# Patient Record
Sex: FEMALE | Race: WHITE | NOT HISPANIC OR LATINO | Employment: FULL TIME | URBAN - METROPOLITAN AREA
[De-identification: names, ages, dates, MRNs, and addresses within clinical notes are randomized per-mention and may not be internally consistent; named-entity substitution may affect disease eponyms.]

---

## 2023-08-26 LAB
CHLAMYDIA TRACH., AMPLIFIED: NEGATIVE
N. GONORRHEA, AMPLIFIED: NEGATIVE
TRICHOMONAS VAGINALIS: NEGATIVE

## 2024-04-02 ENCOUNTER — PROCEDURE VISIT (OUTPATIENT)
Dept: OBSTETRICS AND GYNECOLOGY | Facility: HOSPITAL | Age: 24
End: 2024-04-02
Payer: COMMERCIAL

## 2024-04-02 VITALS
DIASTOLIC BLOOD PRESSURE: 87 MMHG | WEIGHT: 165 LBS | BODY MASS INDEX: 26.52 KG/M2 | SYSTOLIC BLOOD PRESSURE: 121 MMHG | HEIGHT: 66 IN | HEART RATE: 93 BPM

## 2024-04-02 DIAGNOSIS — Z30.432 ENCOUNTER FOR IUD REMOVAL: Primary | ICD-10-CM

## 2024-04-02 ASSESSMENT — PAIN SCALES - GENERAL: PAINLEVEL: 0-NO PAIN

## 2024-04-02 NOTE — PROGRESS NOTES
"Assessment/Plan   Problem List Items Addressed This Visit    None  Visit Diagnoses         Codes    Encounter for IUD removal    -  Primary Z30.432            The patient was advised to call for any fever or for prolonged or severe pain or bleeding. She was advised to use OTC acetaminophen and OTC ibuprofen as needed for mild to moderate pain.     ATTILA Garrison-PEDRO    Eli Ramsey is a 23 y.o. female who presents for IUD removal.    HPI    Patient with Mirena IUD placed 2 years ago. She desires removal d/t irregular spotting; will use condoms for birth control.    Menstrual History:  OB History   No obstetric history on file.     Patient's last menstrual period was 03/30/2024 (exact date).     Objective   /87 (BP Location: Left arm, Patient Position: Sitting, BP Cuff Size: Adult)   Pulse 93   Ht 1.676 m (5' 6\")   Wt 74.8 kg (165 lb)   LMP 03/30/2024 (Exact Date)   BMI 26.63 kg/m²     IUD Removal Procedure Note  Procedure Details  IUD strings visible:  yes  Local anesthesia:  None  Tenaculum used:  None  Removal:  IUD strings grasped and IUD removed intact with gentle traction.  The patient tolerated the procedure well.    All appropriate instructions regarding removal were reviewed.    Plans for contraception:  condoms    Other follow-up needed:  none   "

## 2024-04-17 ENCOUNTER — APPOINTMENT (OUTPATIENT)
Dept: OBSTETRICS AND GYNECOLOGY | Facility: HOSPITAL | Age: 24
End: 2024-04-17
Payer: COMMERCIAL

## 2024-04-30 ENCOUNTER — APPOINTMENT (OUTPATIENT)
Dept: OBSTETRICS AND GYNECOLOGY | Facility: HOSPITAL | Age: 24
End: 2024-04-30
Payer: COMMERCIAL

## 2024-05-30 ENCOUNTER — LAB REQUISITION (OUTPATIENT)
Dept: LAB | Facility: HOSPITAL | Age: 24
End: 2024-05-30
Payer: COMMERCIAL

## 2024-05-30 DIAGNOSIS — R30.0 DYSURIA: ICD-10-CM

## 2024-05-30 PROCEDURE — 87086 URINE CULTURE/COLONY COUNT: CPT

## 2024-06-01 LAB — BACTERIA UR CULT: NORMAL

## 2025-02-21 ENCOUNTER — APPOINTMENT (OUTPATIENT)
Dept: PRIMARY CARE | Facility: CLINIC | Age: 25
End: 2025-02-21
Payer: COMMERCIAL

## 2025-03-07 ENCOUNTER — APPOINTMENT (OUTPATIENT)
Dept: PRIMARY CARE | Facility: CLINIC | Age: 25
End: 2025-03-07
Payer: COMMERCIAL

## 2025-03-07 VITALS
WEIGHT: 151 LBS | BODY MASS INDEX: 24.27 KG/M2 | HEIGHT: 66 IN | HEART RATE: 85 BPM | SYSTOLIC BLOOD PRESSURE: 121 MMHG | DIASTOLIC BLOOD PRESSURE: 79 MMHG | OXYGEN SATURATION: 97 %

## 2025-03-07 DIAGNOSIS — Z13.220 SCREENING FOR HYPERLIPIDEMIA: ICD-10-CM

## 2025-03-07 DIAGNOSIS — Q27.9 VENOUS MALFORMATION (HHS-HCC): ICD-10-CM

## 2025-03-07 DIAGNOSIS — T78.40XA ALLERGY, INITIAL ENCOUNTER: ICD-10-CM

## 2025-03-07 DIAGNOSIS — Z13.21 ENCOUNTER FOR VITAMIN DEFICIENCY SCREENING: ICD-10-CM

## 2025-03-07 DIAGNOSIS — Z80.3 FAMILY HISTORY OF BREAST CANCER IN FEMALE: ICD-10-CM

## 2025-03-07 DIAGNOSIS — Z00.00 WELLNESS EXAMINATION: Primary | ICD-10-CM

## 2025-03-07 PROCEDURE — 3008F BODY MASS INDEX DOCD: CPT

## 2025-03-07 PROCEDURE — 99385 PREV VISIT NEW AGE 18-39: CPT

## 2025-03-07 PROCEDURE — 1036F TOBACCO NON-USER: CPT

## 2025-03-07 ASSESSMENT — PATIENT HEALTH QUESTIONNAIRE - PHQ9
1. LITTLE INTEREST OR PLEASURE IN DOING THINGS: NOT AT ALL
2. FEELING DOWN, DEPRESSED OR HOPELESS: NOT AT ALL
SUM OF ALL RESPONSES TO PHQ9 QUESTIONS 1 AND 2: 0

## 2025-03-07 NOTE — PATIENT INSTRUCTIONS
Please make sure you fast for 8 hours prior to getting labs completed.     Dr. [facility] out of [place]

## 2025-03-07 NOTE — PROGRESS NOTES
"  Subjective   Patient ID: Roxy Diallo is a 24 y.o. female who presents for Establish Care (Pt states she is here to establish care. Pt states she doesn't see OBGYN. Last pap 2024. No other concerns at the moment.).    Patient presents to establish care and have yearly physical.  Patient states that she does have a past medical history of anxiety/depression, if it cannot allergies to multiple things, and venous malformation of the right upper extremity.  Patient states that the venous malformation has been there since she has been a child and had seen vascular surgery at 1 time who recommended an MRI but however she has never obtained this.    Patient states that with her anxiety/depression she was previously on an SSRI however she has been off that for roughly about 2 years and has been doing significantly well.  Patient states that she does therapy sessions once or twice a month which is helpful.    Patient states that she has a allergy to mangoes penicillin G, however she states that it seems like her allergies are getting worse over the years and was wanting to see an allergist about what else she could possibly be allergic to.        Past Medical, Surgical, Social and Family Hx reviewed-Yes      Last pap: Unknown  Last Mammogram: N/A  Colon CA screening Hx: N/A  Labs: CMP, Vit D, Lipid  Up to date on immunizations: Declined Tdap at this time  Dentist in the past year: No     Menstruation: Periods regular, no concerns   Sexual Activity: No concerns         PE:  /79   Pulse 85   Ht 1.676 m (5' 6\")   Wt 68.5 kg (151 lb)   LMP 02/13/2025   SpO2 97%   BMI 24.37 kg/m²   Alert adult woman, NAD  HEENT clear  Neck supple, No LAD  Heart RRR no murmur  Lungs CTA bilat  Abdomen benign, normal BS, soft NT/ND  Skin warm, dry, clear, Venous Malformation of right upper extremity  Neuro grossly normal, gait WNL  Affect pleasant and appropriate, memory and judgement WNL    Discussed with patient that we will refer " her to the genetics for significant family history of breast cancer, we will refer to vascular for her venous malformation of right upper extremity and to an allergist for her worsening allergies.  Baseline lab work ordered for yearly physical.  Recommending following up in 1 year for yearly physical.    Assessment/Plan   Problem List Items Addressed This Visit             ICD-10-CM    Family history of breast cancer in female Z80.3    Relevant Orders    Referral to Genetics    Venous malformation ([facility]) Q27.9    Relevant Orders    Referral to Vascular Surgery    Wellness examination - Primary Z00.00    Relevant Orders    Comprehensive Metabolic Panel    Allergies T78.40XA    Relevant Orders    Referral to Allergy     Other Visit Diagnoses         Codes    Screening for hyperlipidemia     Z13.220    Relevant Orders    Lipid Panel    Encounter for vitamin deficiency screening     Z13.21    Relevant Orders    Vitamin D 25-Hydroxy,Total (for eval of Vitamin D levels)

## 2025-05-08 LAB
25(OH)D3+25(OH)D2 SERPL-MCNC: 24 NG/ML (ref 30–100)
ALBUMIN SERPL-MCNC: 4.9 G/DL (ref 3.6–5.1)
ALP SERPL-CCNC: 79 U/L (ref 31–125)
ALT SERPL-CCNC: 9 U/L (ref 6–29)
ANION GAP SERPL CALCULATED.4IONS-SCNC: 9 MMOL/L (CALC) (ref 7–17)
AST SERPL-CCNC: 14 U/L (ref 10–30)
BILIRUB SERPL-MCNC: 0.7 MG/DL (ref 0.2–1.2)
BUN SERPL-MCNC: 16 MG/DL (ref 7–25)
CALCIUM SERPL-MCNC: 9.4 MG/DL (ref 8.6–10.2)
CHLORIDE SERPL-SCNC: 106 MMOL/L (ref 98–110)
CHOLEST SERPL-MCNC: 141 MG/DL
CHOLEST/HDLC SERPL: 2.6 (CALC)
CO2 SERPL-SCNC: 24 MMOL/L (ref 20–32)
CREAT SERPL-MCNC: 0.69 MG/DL (ref 0.5–0.96)
EGFRCR SERPLBLD CKD-EPI 2021: 125 ML/MIN/1.73M2
GLUCOSE SERPL-MCNC: 73 MG/DL (ref 65–99)
HDLC SERPL-MCNC: 55 MG/DL
LDLC SERPL CALC-MCNC: 71 MG/DL (CALC)
NONHDLC SERPL-MCNC: 86 MG/DL (CALC)
POTASSIUM SERPL-SCNC: 4.4 MMOL/L (ref 3.5–5.3)
PROT SERPL-MCNC: 7 G/DL (ref 6.1–8.1)
SODIUM SERPL-SCNC: 139 MMOL/L (ref 135–146)
TRIGL SERPL-MCNC: 70 MG/DL

## 2025-07-13 ENCOUNTER — TELEMEDICINE (OUTPATIENT)
Dept: PRIMARY CARE | Facility: CLINIC | Age: 25
End: 2025-07-13
Payer: COMMERCIAL

## 2025-07-13 DIAGNOSIS — L24.7 IRRITANT CONTACT DERMATITIS DUE TO PLANTS, EXCEPT FOOD: Primary | ICD-10-CM

## 2025-07-13 PROCEDURE — 99213 OFFICE O/P EST LOW 20 MIN: CPT

## 2025-07-13 RX ORDER — CLOBETASOL PROPIONATE 0.5 MG/G
CREAM TOPICAL 2 TIMES DAILY
Qty: 60 G | Refills: 0 | Status: SHIPPED | OUTPATIENT
Start: 2025-07-13 | End: 2025-07-20

## 2025-07-13 NOTE — PROGRESS NOTES
Subjective   Patient ID: Roxy Diallo is a 24 y.o. female who presents for No chief complaint on file..  HPI    Patient presents for complaints of possible poison ivy to left cheek, right forearm, and abdomen.  Patient states that she has been working out in her yard as she is getting ready to sell her house and noticed it about a week and a half ago.  Patient states that she has tried everything over-the-counter with minimal relief of symptoms.  Patient states that she has had poison ivy in the past and seems similar to previous episodes.      Objective   Physical Exam  Constitutional:       Appearance: Normal appearance.   Pulmonary:      Effort: Pulmonary effort is normal.   Skin:     Findings: Rash (left cheek, right forearm) present.   Neurological:      Mental Status: She is alert and oriented to person, place, and time.       Discussed with patient if rash does not improve to send me a message in [facility] and we will send over prescription for prednisone.  Informed her if the rash gets worse she needs to follow-up with me in the office for further evaluation.    Assessment/Plan   Problem List Items Addressed This Visit           ICD-10-CM    Irritant contact dermatitis due to plants, except food - Primary L24.7    Relevant Medications    clobetasol (Temovate) 0.05 % cream     Virtual or Telephone Consent    An interactive audio and video telecommunication system which permits real time communications between the patient (at the originating site) and provider (at the distant site) was utilized to provide this telehealth service.   Verbal consent was requested and obtained from Roxy Diallo on this date, 07/13/25 for a telehealth visit and the patient's location was confirmed at the time of the visit.   REED Carrasco 07/13/25 7:24 PM

## 2025-07-18 DIAGNOSIS — L24.7 IRRITANT CONTACT DERMATITIS DUE TO PLANTS, EXCEPT FOOD: Primary | ICD-10-CM

## 2025-07-18 RX ORDER — PREDNISONE 10 MG/1
TABLET ORAL
Qty: 30 TABLET | Refills: 0 | Status: SHIPPED | OUTPATIENT
Start: 2025-07-18 | End: 2025-07-28